# Patient Record
Sex: MALE | Race: BLACK OR AFRICAN AMERICAN | URBAN - METROPOLITAN AREA
[De-identification: names, ages, dates, MRNs, and addresses within clinical notes are randomized per-mention and may not be internally consistent; named-entity substitution may affect disease eponyms.]

---

## 2019-02-13 ENCOUNTER — TELEPHONE (OUTPATIENT)
Dept: ENT CLINIC | Age: 44
End: 2019-02-13

## 2019-03-04 ENCOUNTER — TELEPHONE (OUTPATIENT)
Dept: ENT CLINIC | Age: 44
End: 2019-03-04

## 2019-03-29 ENCOUNTER — OFFICE VISIT (OUTPATIENT)
Dept: ENT CLINIC | Age: 44
End: 2019-03-29

## 2019-03-29 VITALS
SYSTOLIC BLOOD PRESSURE: 122 MMHG | HEART RATE: 57 BPM | WEIGHT: 186 LBS | HEIGHT: 72 IN | DIASTOLIC BLOOD PRESSURE: 76 MMHG | BODY MASS INDEX: 25.19 KG/M2

## 2019-03-29 DIAGNOSIS — R19.6 HALITOSIS: Primary | ICD-10-CM

## 2019-03-29 DIAGNOSIS — J32.9 CHRONIC SINUSITIS, UNSPECIFIED LOCATION: ICD-10-CM

## 2019-03-29 DIAGNOSIS — J35.1 TONSILLAR HYPERTROPHY: ICD-10-CM

## 2019-03-29 DIAGNOSIS — F17.210 CIGARETTE SMOKER MOTIVATED TO QUIT: ICD-10-CM

## 2019-03-29 PROCEDURE — 99203 OFFICE O/P NEW LOW 30 MIN: CPT | Performed by: OTOLARYNGOLOGY

## 2019-03-29 RX ORDER — AMOXICILLIN AND CLAVULANATE POTASSIUM 875; 125 MG/1; MG/1
1 TABLET, FILM COATED ORAL 2 TIMES DAILY
Qty: 28 TABLET | Refills: 0 | Status: SHIPPED | OUTPATIENT
Start: 2019-03-29 | End: 2019-04-12

## 2019-03-29 NOTE — PROGRESS NOTES
[facility]       NEW PATIENT VISIT    PCP:  No primary care provider on file. REFERRED BY:   self        CHIEF COMPLAINT:  Chief Complaint   Patient presents with    Other     polyps in back of throat, bad taste in mouth, bad breath       HISTORY OF PRESENT ILLNESS:       (Location, Quality, Severity, Duration, Timing, Context, Modifying factors, Associated symptoms)  Michael Smith is a 37 y.o. male here for evaluation of a problem located in the throat. The quality is bad breath. The severity is severe. The duration is one year. The timing is constant. Associated symptoms include bad taste in mouth and bad smell. Has had one episode of strep throat on the past year. \"I have a constant morning breath smell in the back of my throat. \"  When stick tongue out and do this I can smell and taste it. I don't want to speak. And it is a social problem. Affecting my mental now and it gets me depressed. Brushed back of tongue gargled with peroxide and listerine. Smart mouth and thera-breathe, \"but that stuff just wears off\" \"after a few hours. \"  Helped temporarily. Nasal saline irrigations have not helped. Sinus infections none in past 3-5 years. No oral drainange no post nasal drainage. \"Seems to be right at the back of my tongue is where it all is. \"  cigarette 0.5 ppd x 27 years. Stop smoking. Occupation:  Makes parts for robot arms. REVIEW OF SYSTEMS:    CONSTITUTIONAL:  Denied fever. Denied unexplained weight loss. EARS, NOSE, THROAT:  Denied otorrhea, otalgia, hearing loss, tinnitus, nasal dyspnea, rhinorrhea, sore throat and hoarseness. PAST MEDICAL HISTORY:    History reviewed. No pertinent past medical history. History reviewed. No pertinent surgical history. EXAMINATION:    VITALS SIGNS reviewed.    Vitals:    03/29/19 0910   BP: 122/76   Site: Left Upper Arm   Position: Sitting   Cuff Size: Medium Adult   Pulse: 57   Weight: 186 lb (84.4 kg)   Height: 6' (1.829 m)     GENERAL APPEARANCE:  WDWN, in NAD,  A&Ox3  ABILITY TO COMMUNICATE/QUALITY OF VOICE:  Normal, with no hoarseness or breathiness. SALIVARY GLANDS:  The parotid gland and salivary gland were normal bilaterally. INSPECTION, HEAD AND FACE:  Normal with no masses, lesions, or deformities. FACIAL STRENGTH, MOTION:  Facial nerve function was intact and normal bilaterally for all 5 branches. SINUSES:  The frontal and maxillary sinuses were nontender, bilaterally. EXTERNAL EAR/NOSE:  The pinnae, mastoids, and external nose were normal bilaterally. EARS, OTOSCOPY: the tympanic membranes and external auditory canals were normal bilaterally. NOSE:  Septal deviation to the left moderately severe with a large left inferior spur. LIPS, TEETH AND GUMS:  Normal.  (+) OROPHARYNX/ORAL CAVITY:  Tonsils 3+, non-erythematous, and cryptic bilaterally. Otherwise normal.  NECK:  Normal, with no masses, tenderness, or tracheal deviation. THYROID:  Normal with no goiter, nodules, or tenderness. LYMPH NODES, CERVICAL, FACIAL AND SUPRACLAVICULAR:  No lymphadenopathy detected. IMPRESSION / Alina Easton / Daryl Pour:       Aidee Meza was seen today for other. Diagnoses and all orders for this visit:    Halitosis  -     amoxicillin-clavulanate (AUGMENTIN) 875-125 MG per tablet; Take 1 tablet by mouth 2 times daily for 14 days    Tonsillar hypertrophy  -     amoxicillin-clavulanate (AUGMENTIN) 875-125 MG per tablet; Take 1 tablet by mouth 2 times daily for 14 days    Chronic sinusitis, unspecified location    Cigarette smoker motivated to quit         RECOMMENDATIONS/PLAN:    1. CT scan sinuses. 2. Consider tonsillectomy. 3. Return for flexible fiberoptic nasopharyngolaryngoscopy.        >> Please note portions of this note were completed with a voice recognition program.  Efforts were made to edit the dictations but occasionally words are mis-transcribed.